# Patient Record
Sex: FEMALE | Race: BLACK OR AFRICAN AMERICAN
[De-identification: names, ages, dates, MRNs, and addresses within clinical notes are randomized per-mention and may not be internally consistent; named-entity substitution may affect disease eponyms.]

---

## 2018-07-26 ENCOUNTER — HOSPITAL ENCOUNTER (EMERGENCY)
Dept: HOSPITAL 68 - ERH | Age: 58
End: 2018-07-26
Payer: COMMERCIAL

## 2018-07-26 VITALS — HEIGHT: 64 IN | WEIGHT: 163 LBS | BODY MASS INDEX: 27.83 KG/M2

## 2018-07-26 VITALS — SYSTOLIC BLOOD PRESSURE: 142 MMHG | DIASTOLIC BLOOD PRESSURE: 76 MMHG
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
## 2018-07-26 DIAGNOSIS — S09.90XA: Primary | ICD-10-CM
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
## 2018-07-26 NOTE — CT SCAN REPORT
EXAMINATION:
CT HEAD WITHOUT CONTRAST
 
CLINICAL INFORMATION:
Status post fall with head strike. Evaluate for intracranial hemorrhage.
 
COMPARISON:
No relevant prior imaging.
 
TECHNIQUE:
Contiguous axial imaging was performed from the skull base to vertex without
intravenous administration of contrast.
 
DLP:
613.57 mGy-cm
 
FINDINGS:
There is no acute intracranial hemorrhage or abnormal extra-axial collection.
No intracranial mass effect or midline shift. Lateral and third ventricles
are normal. No hydrocephalus. Gray-white matter differentiation is grossly
preserved and there is no evidence of acute territorial infarct. The
calvarium and skull base are intact. Mastoid air cells and middle ear
cavities are well aerated. Visualized paranasal sinuses are well aerated.
 
IMPRESSION:
Unremarkable CT scan of the head. No acute intracranial hemorrhage.

## 2018-07-26 NOTE — ED HEAD/FACIAL INJ COMPLAINT
History of Present Illness
 
General
Chief Complaint: Facial or Head Injury
Stated Complaint: FALL TODAY, C/O NAUSEA,HEADACHE
Source: patient
Exam Limitations: no limitations
 
Vital Signs & Intake/Output
Vital Signs & Intake/Output
 Vital Signs
 
 
Date Time Temp Pulse Resp B/P B/P Pulse O2 O2 Flow FiO2
 
     Mean Ox Delivery Rate 
 
2028 98.7 64 18 142/76  97 Room Air  
 
7 98.4 55 18 151/85  98 Room Air  
 
 
 ED Intake and Output
 
 
  0000  1200
 
Intake Total  
 
Output Total  
 
Balance  
 
   
 
Patient 163 lb 
 
Weight  
 
Weight Reported by Patient 
 
Measurement  
 
Method  
 
 
 
Allergies
Coded Allergies:
No Known Allergies (10/16/17)
 
Reconcile Medications
Fluoxetine HCl 20 MG CAPSULE   1 CAP PO EOD MENTAL HEALTH  (Reported)
Fluoxetine HCl 40 MG CAPSULE   1 CAP PO EOD MENTAL HEALTH  (Reported)
Lisinopril/Hydrochlorothiazide (Lisinopril-Hctz 10-12.5 MG Tab) 10 MG-12.5 MG 
TABLET   1 TAB PO DAILY BP  (Reported)
Multiple Vitamin (Multivitamins) 1 EACH TABLET   1 TAB PO DAILY SUPPLEMENT  (
Reported)
 
Triage Note:
59 YO FEMALE TO TRIAGE FOR EVAL OF HA/NAUSEA S/P
 TRIP AND FALL. PT REPORTS SHE MISSED A STEP AND
 HIT THE THE CHEECK ON THE FLOOR. DENIES LOC.
 REPORTS SINCE THEN SHE HAS BEEN +NAUSEA AND HAD A
 HA. PT A&O X3.
Triage Nurses Notes Reviewed? yes
Onset: Abrupt
Severity: moderate
Location: frontal
Method of Injury: fall
Loss of Consciousness: no loss of consciousness
HPI:
58-year-old female presents emergency department complaining of fall with head 
strike prior to arrival.  Patient states that she was at the gym when she fell 
onto the right side of her face.  There was no loss of consciousness or 
blackout.  Patient reports moderate headache to bilateral frontal region.  She 
states she had nausea earlier when she arrived here in the hospital however this
has resolved.  Patient reports mild worsening of headache when she attempts to 
redirect or focus her eyes.  Patient also reports mild pain to right rib cage 
area in which she described as slight aching.  She denies vomiting, visual 
changes, abdominal pain, pleuritic pain.
(Cleo HUNTER,Cydney Pedersen)
 
Past History
 
Travel History
Traveled to [place] past 21 day No
 
Medical History
Any Pertinent Medical History? see below for history
Neurological: NONE
EENT: NONE
Cardiovascular: hypertension
Respiratory: NONE
Gastrointestinal: NONE
Hepatic: NONE
Renal: NONE
Musculoskeletal: NONE
Psychiatric: depression, PTSD
Endocrine: NONE
Blood Disorders: NONE
Cancer(s): NONE
GYN/Reproductive: ECTOPIC PREGNANCY
History of MRSA: No
History of VRE: No
History of CDIFF: No
 
Surgical History
Surgical History: GALL BLADDER REMOVAL FIBROID REMOVAL
 
Psychosocial History
Who do you live with Spouse
Services at Home None
What is your primary language English
Tobacco Use: Never used
 
Family History
Hx Contributory? No
(Cydney Sutherland)
 
Review of Systems
 
Review of Systems
Constitutional:
Reports: no symptoms. 
EENTM:
Reports: no symptoms. 
Respiratory:
Reports: no symptoms. 
Cardiovascular:
Reports: no symptoms. 
GI:
Reports: see HPI. 
Genitourinary:
Reports: no symptoms. 
Musculoskeletal:
Reports: see HPI. 
Skin:
Reports: no symptoms. 
Neurological/Psychological:
Reports: see HPI. 
Hematologic/Endocrine:
Reports: no symptoms. 
Immunologic/Allergic:
Reports: no symptoms. 
All Other Systems: Reviewed and Negative
(Cydney Sutherland)
 
Physical Exam
 
Physical Exam
General Appearance: well developed/nourished, no apparent distress, alert, awake
Head: atraumatic, normal appearance
Eyes:
Bilateral: normal appearance. 
Ears, Nose, Throat: hearing grossly normal
Neck: normal inspection, supple, full range of motion
Respiratory: normal breath sounds, no respiratory distress, lungs clear, mild 
right sided rib cage tenderness
Cardiovascular: regular rate/rhythm
Back: normal inspection, normal range of motion
Extremities: normal inspection, normal range of motion
Psychiatric: awake, alert, oriented x 3
Cranial Nerves: normal hearing, normal speech, PERRL, CN II-XII intact
Coordination/Gait: normal finger to nose, normal gait
Motor/Sensory: no motor/sensory deficits
Skin: intact, normal color, warm/dry
(Cydney Sutherland)
 
Progress
Differential Diagnosis: ICH, skull fracture, rib fracture, concussion
Plan of Care:
Patient's head CT scan is within normal limits.  She is neurologically intact, 
answers questions readily, ambulates with a steady gait.  Patient symptoms may 
be related to mild concussion.  She is educated on signs and symptoms of 
concussion and given a handout of information regarding concussions.  Patient 
was offered rib x-rays for her rib tenderness however declines.  Her tenderness 
is mild, no pleuritic pain, breath sounds are clear bilaterally, low suspicion 
for acute displaced rib fracture in this patient based on these findings and her
mechanism of injury.  Patient will return with worsening symptoms or concerns.  
She agrees with the plan of care.
Diagnostic Imaging:
Viewed by Me: CT Scan.  Discussed w/RAD: CT Scan. 
Radiology Impression: PATIENT: MARA ADAM  MEDICAL RECORD NO: [id number] PRESENT
AGE: 58  PATIENT ACCOUNT NO: [id number] : 62  LOCATION: [facility] ORDERING 
PHYSICIAN: Cydney HUNTER     SERVICE DATE:  EXAM TYPE: CAT -
CT HEAD WO IV CONTRAST EXAMINATION: CT HEAD WITHOUT CONTRAST CLINICAL 
INFORMATION: Status post fall with head strike. Evaluate for intracranial 
hemorrhage. COMPARISON: No relevant prior imaging. TECHNIQUE: Contiguous axial 
imaging was performed from the skull base to vertex without intravenous 
administration of contrast. DLP: 613.57 mGy-cm FINDINGS: There is no acute 
intracranial hemorrhage or abnormal extra-axial collection. No intracranial mass
effect or midline shift. Lateral and third ventricles are normal. No 
hydrocephalus. Gray-white matter differentiation is grossly preserved and there 
is no evidence of acute territorial infarct. The calvarium and skull base are 
intact. Mastoid air cells and middle ear cavities are well aerated. Visualized 
paranasal sinuses are well aerated. IMPRESSION: Unremarkable CT scan of the 
head. No acute intracranial hemorrhage. DICTATED BY: Chele Quarles MD  DATE/
TIME DICTATED:18 :RAD.RINCON  DATE/TIME TRANSCRIBED:
18 CONFIDENTIAL, DO NOT COPY WITHOUT APPROPRIATE AUTHORIZATION.  <
Electronically signed in Other Vendor System>                                   
                                                    SIGNED BY: Chele Quarles MD 18
(Cleo HUNTER,Cydney Pedersen)
 
Departure
 
Departure
Disposition: HOME OR SELF CARE
Condition: Stable
Clinical Impression
Primary Impression: Fall
Qualifiers:  Encounter type: initial encounter Qualified Code: W19.XXXA - 
Unspecified fall, initial encounter
Secondary Impressions: 
Head injury
Qualifiers:  Encounter type: initial encounter Qualified Code: S09.90XA - 
Unspecified injury of head, initial encounter
 
Referrals:
Autumn SIMON,SELENE Joseph (PCP/Family)
 
Additional Instructions:
You may have a concussion.  You were given a packet on some symptoms of 
concussion.  Avoid excessive televisions/computer use, reading, exercise until 
your symptoms have resolved.  It is recommended you do not continue to exercise 
in to your symptoms have been on for 24 hours.  With any worsening symptoms or 
concerns please return here to the emergency department.
 
Please note that there might be incidental findings in your evaluation that are 
unrelated to the current emergency department visit.  Please notify your primary
care doctor about this emergency department visit in order to obtain and review 
all of the testing performed so that these incidental findings can be monitored 
as needed.
 
If you had an x-ray performed, please understand that some fractures may not be 
seen on the initial set of x-rays.  If your symptoms persist you might need a 
repeat set of x-rays to check for such a fracture.
 
If you had a laceration evaluated, please understand that foreign bodies such as
glass or wood may not be visible to the naked eye or on plain x-rays.  If the 
wound becomes red, swollen, increasingly more painful or if there is any 
drainage from the wound, please have it reevaluated by a physician for the 
possibility of a retained foreign body.
 
If you're unable to follow up as outlined in the discharge instructions please 
return to the emergency department.
 
Thank you for choosing the [facility] Emergency Department for your care.
It was a pleasure to serve you today.
Departure Forms:
Customer Survey
General Discharge Information
(Cleo HUNTER,Cydney Pedersen)
 
PA/NP Co-Sign Statement
Statement:
ED Attending supervision documentation-
 
 I saw and evaluated the patient. I have also reviewed all the pertinent lab 
results and diagnostic results. I agree with the findings and the plan of care 
as documented in the PA's/NP's documentation. 
 
x I have reviewed the ED Record and agree with the PA's/NP's documentation.
 
[] Additions or exceptions (if any) to the PAs/NP's note and plan are 
summarized below:
[]
 
(Maty SIMON,Albert)